# Patient Record
Sex: MALE | Race: WHITE | Employment: OTHER | URBAN - METROPOLITAN AREA
[De-identification: names, ages, dates, MRNs, and addresses within clinical notes are randomized per-mention and may not be internally consistent; named-entity substitution may affect disease eponyms.]

---

## 2021-09-17 ENCOUNTER — ANESTHESIA EVENT (OUTPATIENT)
Dept: ENDOSCOPY | Age: 56
End: 2021-09-17
Payer: COMMERCIAL

## 2021-09-20 ENCOUNTER — HOSPITAL ENCOUNTER (OUTPATIENT)
Age: 56
Setting detail: OUTPATIENT SURGERY
Discharge: HOME | End: 2021-09-20
Attending: INTERNAL MEDICINE | Admitting: INTERNAL MEDICINE
Payer: COMMERCIAL

## 2021-09-20 ENCOUNTER — ANESTHESIA (OUTPATIENT)
Dept: ENDOSCOPY | Age: 56
End: 2021-09-20
Payer: COMMERCIAL

## 2021-09-20 VITALS — DIASTOLIC BLOOD PRESSURE: 71 MMHG | OXYGEN SATURATION: 98 % | SYSTOLIC BLOOD PRESSURE: 134 MMHG

## 2021-09-20 VITALS
HEIGHT: 72 IN | DIASTOLIC BLOOD PRESSURE: 72 MMHG | HEART RATE: 74 BPM | WEIGHT: 315 LBS | BODY MASS INDEX: 42.66 KG/M2 | SYSTOLIC BLOOD PRESSURE: 136 MMHG | TEMPERATURE: 97.6 F | RESPIRATION RATE: 20 BRPM | OXYGEN SATURATION: 96 %

## 2021-09-20 DIAGNOSIS — R63.4 WEIGHT LOSS: ICD-10-CM

## 2021-09-20 DIAGNOSIS — K21.9 GASTROESOPHAGEAL REFLUX DISEASE, UNSPECIFIED WHETHER ESOPHAGITIS PRESENT: ICD-10-CM

## 2021-09-20 DIAGNOSIS — G47.33 OBSTRUCTIVE SLEEP APNEA: ICD-10-CM

## 2021-09-20 DIAGNOSIS — E66.9 OBESITY, UNSPECIFIED CLASSIFICATION, UNSPECIFIED OBESITY TYPE, UNSPECIFIED WHETHER SERIOUS COMORBIDITY PRESENT: ICD-10-CM

## 2021-09-20 LAB
GLUCOSE BLD-MCNC: 76 MG/DL (ref 70–99)
PERFORMED ON: NORMAL

## 2021-09-20 PROCEDURE — 3609010300 HC COLONOSCOPY W/BIOPSY SINGLE/MULTIPLE: Performed by: INTERNAL MEDICINE

## 2021-09-20 PROCEDURE — 3700000001 HC ADD 15 MINUTES (ANESTHESIA): Performed by: INTERNAL MEDICINE

## 2021-09-20 PROCEDURE — 7100000011 HC PHASE II RECOVERY - ADDTL 15 MIN: Performed by: INTERNAL MEDICINE

## 2021-09-20 PROCEDURE — 7100000010 HC PHASE II RECOVERY - FIRST 15 MIN: Performed by: INTERNAL MEDICINE

## 2021-09-20 PROCEDURE — 3700000000 HC ANESTHESIA ATTENDED CARE: Performed by: INTERNAL MEDICINE

## 2021-09-20 PROCEDURE — 2500000003 HC RX 250 WO HCPCS: Performed by: NURSE ANESTHETIST, CERTIFIED REGISTERED

## 2021-09-20 PROCEDURE — 88305 TISSUE EXAM BY PATHOLOGIST: CPT

## 2021-09-20 PROCEDURE — 2580000003 HC RX 258: Performed by: ANESTHESIOLOGY

## 2021-09-20 PROCEDURE — 6360000002 HC RX W HCPCS: Performed by: INTERNAL MEDICINE

## 2021-09-20 PROCEDURE — 3609017100 HC EGD: Performed by: INTERNAL MEDICINE

## 2021-09-20 PROCEDURE — 6360000002 HC RX W HCPCS: Performed by: NURSE ANESTHETIST, CERTIFIED REGISTERED

## 2021-09-20 PROCEDURE — 2709999900 HC NON-CHARGEABLE SUPPLY: Performed by: INTERNAL MEDICINE

## 2021-09-20 RX ORDER — SODIUM CHLORIDE, SODIUM LACTATE, POTASSIUM CHLORIDE, CALCIUM CHLORIDE 600; 310; 30; 20 MG/100ML; MG/100ML; MG/100ML; MG/100ML
INJECTION, SOLUTION INTRAVENOUS CONTINUOUS
Status: DISCONTINUED | OUTPATIENT
Start: 2021-09-20 | End: 2021-09-20 | Stop reason: HOSPADM

## 2021-09-20 RX ORDER — HEPARIN SODIUM (PORCINE) LOCK FLUSH IV SOLN 100 UNIT/ML 100 UNIT/ML
100 SOLUTION INTRAVENOUS PRN
Status: DISCONTINUED | OUTPATIENT
Start: 2021-09-20 | End: 2021-09-20 | Stop reason: HOSPADM

## 2021-09-20 RX ORDER — PROPOFOL 10 MG/ML
INJECTION, EMULSION INTRAVENOUS PRN
Status: DISCONTINUED | OUTPATIENT
Start: 2021-09-20 | End: 2021-09-20 | Stop reason: SDUPTHER

## 2021-09-20 RX ORDER — LIDOCAINE HYDROCHLORIDE 20 MG/ML
INJECTION, SOLUTION EPIDURAL; INFILTRATION; INTRACAUDAL; PERINEURAL PRN
Status: DISCONTINUED | OUTPATIENT
Start: 2021-09-20 | End: 2021-09-20 | Stop reason: SDUPTHER

## 2021-09-20 RX ADMIN — PROPOFOL 50 MG: 10 INJECTION, EMULSION INTRAVENOUS at 11:20

## 2021-09-20 RX ADMIN — SODIUM CHLORIDE, POTASSIUM CHLORIDE, SODIUM LACTATE AND CALCIUM CHLORIDE: 600; 310; 30; 20 INJECTION, SOLUTION INTRAVENOUS at 10:32

## 2021-09-20 RX ADMIN — PROPOFOL 100 MG: 10 INJECTION, EMULSION INTRAVENOUS at 11:27

## 2021-09-20 RX ADMIN — PROPOFOL 150 MG: 10 INJECTION, EMULSION INTRAVENOUS at 11:15

## 2021-09-20 RX ADMIN — LIDOCAINE HYDROCHLORIDE 100 MG: 20 INJECTION, SOLUTION EPIDURAL; INFILTRATION; INTRACAUDAL; PERINEURAL at 11:15

## 2021-09-20 RX ADMIN — HEPARIN SODIUM (PORCINE) LOCK FLUSH IV SOLN 100 UNIT/ML 100 UNITS: 100 SOLUTION at 12:59

## 2021-09-20 RX ADMIN — PROPOFOL 50 MG: 10 INJECTION, EMULSION INTRAVENOUS at 11:33

## 2021-09-20 ASSESSMENT — PULMONARY FUNCTION TESTS
PIF_VALUE: 1
PIF_VALUE: 2
PIF_VALUE: 1

## 2021-09-20 ASSESSMENT — PAIN - FUNCTIONAL ASSESSMENT
PAIN_FUNCTIONAL_ASSESSMENT: 0-10

## 2021-09-20 ASSESSMENT — LIFESTYLE VARIABLES: SMOKING_STATUS: 0

## 2021-09-20 NOTE — ANESTHESIA POSTPROCEDURE EVALUATION
Department of Anesthesiology  Postprocedure Note    Patient: Lehman Olszewski  MRN: [id number]  YOB: 1965  Date of evaluation: 9/20/2021  Time:  12:17 PM     Procedure Summary     Date: 09/20/21 Room / Location: [facility]    Anesthesia Start: 1110 Anesthesia Stop: 1544    Procedures:       ESOPHAGOGASTRODUODENOSCOPY (N/A )      COLONOSCOPY WITH BIOPSY (N/A ) Diagnosis:       Gastroesophageal reflux disease, unspecified whether esophagitis present      Weight loss      Obesity, unspecified classification, unspecified obesity type, unspecified whether serious comorbidity present      Obstructive sleep apnea      (Gastroesophageal reflux disease, unspecified whether esophagitis present [K21.9] Weight loss [R63.4] Obesity, unspecified classification, unspecified obesity type, unspecified whether serious comorbidity present [E66.9] Obstructive sleep apnea [G47.33])      (Family history of Colorectal Cancer, History of Kidney Cancer and Gstric Mass)    Surgeons: Minerva Sow MD Responsible Provider: Chantell Pineda DO    Anesthesia Type: MAC ASA Status: 3          Anesthesia Type: MAC    Austen Phase I: Austen Score: 10    Austen Phase II:      Last vitals: Reviewed and per EMR flowsheets.        Anesthesia Post Evaluation    Patient location during evaluation: PACU  Patient participation: complete - patient participated  Level of consciousness: awake and alert  Pain score: 0  Airway patency: patent  Nausea & Vomiting: no nausea and no vomiting  Cardiovascular status: blood pressure returned to baseline  Respiratory status: acceptable  Hydration status: euvolemic

## 2021-09-20 NOTE — ANESTHESIA PRE PROCEDURE
Department of Anesthesiology  Preprocedure Note       Name:  Sarah Moore   Age:  60 y.o.  :  1965                                          MRN:  [id number]         Date:  2021      Surgeon: Evelia Mccoy):  Rosita Lema MD    Procedure: Procedure(s):  ESOPHAGOGASTRODUODENOSCOPY  COLONOSCOPY    Medications prior to admission:   Prior to Admission medications    Medication Sig Start Date End Date Taking? Authorizing Provider   METFORMIN HCL PO Take by mouth   Yes Historical Provider, MD   Apixaban (ELIQUIS PO) Take by mouth   Yes Historical Provider, MD   Ferrous Gluconate-C-Folic Acid (IRON-C PO) Take by mouth   Yes Historical Provider, MD   ROPINIROLE HCL PO Take by mouth   Yes Historical Provider, MD   EZETIMIBE-ATORVASTATIN PO Take by mouth   Yes Historical Provider, MD   OMEPRAZOLE PO Take by mouth   Yes Historical Provider, MD   Levothyroxine Sodium (SYNTHROID PO) Take by mouth   Yes Historical Provider, MD   Potassium (POTASSIMIN PO) Take by mouth   Yes Historical Provider, MD       Current medications:    Current Facility-Administered Medications   Medication Dose Route Frequency Provider Last Rate Last Admin    lactated ringers infusion   IntraVENous Continuous Esteban Jesus DO           Allergies:  No Known Allergies    Problem List:  There is no problem list on file for this patient.       Past Medical History:        Diagnosis Date    Diabetes mellitus ([facility] [address]     Hypertension     Thyroid disease        Past Surgical History:        Procedure Laterality Date    PARTIAL NEPHRECTOMY         Social History:    Social History     Tobacco Use    Smoking status: Not on file   Substance Use Topics    Alcohol use: Not on file                                Counseling given: Not Answered      Vital Signs (Current):   Vitals:    21 0936   BP: (!) 142/77   Pulse: 93   Resp: 20   Temp: 97.5 °F (36.4 °C)   TempSrc: Oral   SpO2: 93%   Weight: (!) 340 lb (154.2 kg)   Height: 6' (1.829 m)                                              BP Readings from Last 3 Encounters:   09/20/21 (!) 142/77       NPO Status:                                                                                 BMI:   Wt Readings from Last 3 Encounters:   09/20/21 (!) 340 lb (154.2 kg)     Body mass index is 46.11 kg/m². CBC: No results found for: WBC, RBC, HGB, HCT, MCV, RDW, PLT    CMP: No results found for: NA, K, CL, CO2, BUN, CREATININE, GFRAA, AGRATIO, LABGLOM, GLUCOSE, PROT, CALCIUM, BILITOT, ALKPHOS, AST, ALT    POC Tests: No results for input(s): POCGLU, POCNA, POCK, POCCL, POCBUN, POCHEMO, POCHCT in the last 72 hours. Coags: No results found for: PROTIME, INR, APTT    HCG (If Applicable): No results found for: PREGTESTUR, PREGSERUM, HCG, HCGQUANT     ABGs: No results found for: PHART, PO2ART, NBD0QUC, ATZ6MMT, BEART, Q7LWPXTG     Type & Screen (If Applicable):  No results found for: LABABO, LABRH    Drug/Infectious Status (If Applicable):  No results found for: HIV, HEPCAB    COVID-19 Screening (If Applicable): No results found for: COVID19        Anesthesia Evaluation  Patient summary reviewed and Nursing notes reviewed no history of anesthetic complications:   Airway: Mallampati: III  TM distance: >3 FB   Neck ROM: limited  Comment: Large beard  Mouth opening: > = 3 FB Dental:    (+) edentulous      Pulmonary:   (+) sleep apnea: on CPAP,      (-) not a current smoker                           Cardiovascular:    (+) hypertension:,     (-) past MI      Rhythm: regular  Rate: normal                    Neuro/Psych:               GI/Hepatic/Renal:   (+) morbid obesity         ROS comment: H/o gastric CA s/p resection  S/p L nephrectomy  Cirrhosis . Endo/Other:    (+) DiabetesType II DM, , hypothyroidism, blood dyscrasia: anticoagulation therapy:., .                 Abdominal:             Vascular:           Other Findings:           Anesthesia Plan      MAC     ASA 3       Induction: intravenous. MIPS: Prophylactic antiemetics administered. Anesthetic plan and risks discussed with patient. Plan discussed with CRNA.                   Naren Reeves DO   9/20/2021

## 2021-09-20 NOTE — H&P
History and Physical / Pre-Sedation Assessment    Patient:  Danuta Cruz   :   1965     Intended Procedure:  EGD/Colon    HPI: GERD, H/O Gastric mass, Father CRC    Nurses notes reviewed and agreed. Medications reviewed  Allergies: Not on File    Physical Exam:  Vital Signs: There were no vitals taken for this visit. Airway: Mallampati: III (soft palate, base of uvula visible)  Pulmonary:Normal  Cardiac:Normal  Abdomen:Abnormal  Surgery scar Kidney Ca    Pre-Procedure Assessment / Plan:  ASA: Class 3 - A patient with severe systemic disease that limits activity but is not incapacitating  Level of Sedation Plan: per anesthesia  Post Procedure plan: Return to same level of care    I assessed the patient and find that the patient is in satisfactory condition to proceed with the planned procedure and sedation plan. I have explained the risk, benefits, and alternatives to the procedure; the patient understands and agrees to proceed.        Desiree Lyman MD  2021

## 2021-09-20 NOTE — BRIEF OP NOTE
Brief Postoperative Note      Patient: Jeaneen Kussmaul  YOB: 1965  MRN: [id number]    Date of Procedure: 9/20/2021    Pre-Op Diagnosis: Gastroesophageal reflux disease, unspecified whether esophagitis present [K21.9] Weight loss [R63.4] Obesity, unspecified classification, unspecified obesity type, unspecified whether serious comorbidity present [E66.9] Obstructive sleep apnea [G47.33]  Family history of Colorectal Cancer, History of Kidney Cancer and Gstric Mass    Post-Op Diagnosis: Same       Procedure(s):  ESOPHAGOGASTRODUODENOSCOPY  COLONOSCOPY    Surgeon(s):  Macho Collado MD    Assistant:  * No surgical staff found *    Anesthesia: Monitor Anesthesia Care    Estimated Blood Loss (mL): Minimal    Complications: None    Specimens:   * No specimens in log *    Implants:  * No implants in log *      Drains: * No LDAs found *    Findings: GAVE, Polyps in DC    Electronically signed by Macho Collado MD on 9/20/2021 at 11:18 AM

## 2021-09-20 NOTE — PROGRESS NOTES
Ambulatory Surgery/Procedure Discharge Note    Patient tolerated procedure well. Patient denies nausea, cramping or pain post procedure. Discharge instructions and education reviewed with patient and Mom. Written instructions provided at discharge. Patient discharged ambulatory in wheelchair to car. Mom to drive pt home. Vitals:    09/20/21 1237   BP: 136/72   Pulse: 74   Resp: 20   Temp:    SpO2: 96%       In: 501 [I.V.:501]  Out: -     Restroom use offered before discharge. Yes    Pain assessment:  none  Pain Level: 0        Patient discharged to home/self care.  Patient discharged via wheel chair by transporter to waiting family/S.O.       9/20/2021 1310 PM

## 2021-09-21 NOTE — PROCEDURES
[facility] [address], [address]                                 PROCEDURE NOTE    PATIENT NAME: Tory Martinez                   :        1965  MED REC NO:   [id number]                          ROOM:  ACCOUNT NO:   [de-identified]                           ADMIT DATE: 2021  PROVIDER:     Rosio Coombs MD    DATE OF PROCEDURE:  2021    ENDOSCOPY REPORT    He was an outpatient. PROCEDURES PERFORMED:  EGD with pictures followed by colonoscopy to  cecum with cold biopsy polypectomy x2 and pictures. SURGEON:  Rosio Coombs MD    INDICATION FOR PROCEDURE:  This is a rather anxious gentleman who is 60 years old and well known to me from before who is here today to have an  EGD and a colonoscopy because of history of chronic heartburn and  unplanned weight loss. Colonoscopy is being done because of personal  history of colon polyps and his father's history of colon cancer with  his last colonoscopy more than three years ago. There is some history  about possible small mass removed from his stomach endoscopically which  was thought to be metastatic kidney cancer, details are unknown. ANALGESIA:  Analgesia was provided by anesthesia service because of his  history of sleep apnea, needing CPAP. Please see their note. PROCEDURE NOTE:  The patient had given informed consent earlier for both  procedures. He was monitored in the standard fashion. EGD was done  first.    EGD NOTE:  In a fasting state in the left lateral decubitus position, an  upper GI video endoscopy was carried out using an Olympus scope without  difficulty. His esophagus grossly appeared normal.  The stomach showed  no mass lesions or raised areas. The distal stomach showed changes  highly suggestive of gastric antral vascular ectasia. No bleeding was  seen.   Pylorus was slightly asymmetrical.  The duodenum was normal.   Retroflex view of the fundus did not show any significant change. The  patient tolerated this part of the exam well. He was then turned around  for colonoscopy. COLONOSCOPY REPORT:  He had been cleaned out using clear liquid diet and  MiraLax prep. Colonoscopy prep was adequate. An adult Olympus video  colonoscope was introduced atraumatically in the rectum. The scope was  gradually advanced all the way to the cecum. There was no major  difficulty doing this. He has small hemorrhoids of no clinical  consequence. There appeared to be mild inflammatory changes in the  rectum suggestive of mild proctitis. There was no bleeding. In the  sigmoid colon and descending colon, there were two small polyps both  about 5 mm in size. They were removed using a cold biopsy forceps  without difficulty. He had large polyp resected endoscopically by me  some years back in the hepatic flexure. That area was tattooed and the  site was easily located. It appears completely clear of any recurrent  or residual polyp which is reassuring. No convincing diverticular  disease was seen. No other obvious polypoid lesions, obstructing or  constricting entities nor any diverticular disease were seen. There was  no angiodysplasia. There was no active bleeding. The patient tolerated  the exam well. Estimated blood loss for both procedures was zero. He  tolerated the exams well. IMPRESSION:  1. Normal esophagus. 2.  Gastric antral vascular ectasia. No bleeding. 3.  History of gastric mass thought to be metastatic kidney cancer. No  mass lesion or raised areas seen. 4.  Internal hemorrhoids without bleeding. 5.  Benign-appearing sigmoid and descending colon polyps. Cold biopsy  polypectomy done and awaited. 6.  Large hepatic flexure polyp resected with electrocautery snare  remotely. Tattooing site appears free of any recurrent or residual  tumor. 7.  Otherwise, grossly normal exam to cecum.   Estimated blood loss zero  for both procedures. PLAN:  Above was explained to the patient when he was awake. Findings  will be communicated to Thomas Cary NP, his PCP in [place]. The  patient will be advised to see me for a followup in the GI Clinic in two  weeks' time to discuss the endoscopy and biopsy report. Given his  high-risk status, periodic surveillance colonoscopies are clearly  indicated.         Josette Beatty MD    D: 09/20/2021 18:03:28       T: 09/21/2021 0:15:50     [email or web address]  Job#: [id number]     Doc#: [id number]    CC:  Howard Billingsley

## (undated) DEVICE — FORCEPS BX L240CM JAW DIA22MM ORNG STD CAP W NDL RAD JAW 4